# Patient Record
Sex: MALE | Race: WHITE | NOT HISPANIC OR LATINO | Employment: FULL TIME | URBAN - METROPOLITAN AREA
[De-identification: names, ages, dates, MRNs, and addresses within clinical notes are randomized per-mention and may not be internally consistent; named-entity substitution may affect disease eponyms.]

---

## 2024-06-13 ENCOUNTER — APPOINTMENT (OUTPATIENT)
Dept: RADIOLOGY | Facility: IMAGING CENTER | Age: 24
End: 2024-06-13
Attending: PHYSICIAN ASSISTANT
Payer: COMMERCIAL

## 2024-06-13 ENCOUNTER — OCCUPATIONAL MEDICINE (OUTPATIENT)
Dept: URGENT CARE | Facility: CLINIC | Age: 24
End: 2024-06-13
Payer: COMMERCIAL

## 2024-06-13 VITALS
RESPIRATION RATE: 18 BRPM | HEIGHT: 71 IN | DIASTOLIC BLOOD PRESSURE: 86 MMHG | HEART RATE: 114 BPM | WEIGHT: 221 LBS | OXYGEN SATURATION: 96 % | SYSTOLIC BLOOD PRESSURE: 122 MMHG | TEMPERATURE: 99.2 F | BODY MASS INDEX: 30.94 KG/M2

## 2024-06-13 DIAGNOSIS — M54.2 NECK PAIN: ICD-10-CM

## 2024-06-13 DIAGNOSIS — S16.1XXA STRAIN OF NECK MUSCLE, INITIAL ENCOUNTER: ICD-10-CM

## 2024-06-13 DIAGNOSIS — Z02.6 ENCOUNTER RELATED TO WORKER'S COMPENSATION CLAIM: ICD-10-CM

## 2024-06-13 PROCEDURE — 3074F SYST BP LT 130 MM HG: CPT | Performed by: PHYSICIAN ASSISTANT

## 2024-06-13 PROCEDURE — 72040 X-RAY EXAM NECK SPINE 2-3 VW: CPT | Mod: TC | Performed by: RADIOLOGY

## 2024-06-13 PROCEDURE — 3079F DIAST BP 80-89 MM HG: CPT | Performed by: PHYSICIAN ASSISTANT

## 2024-06-13 PROCEDURE — 99203 OFFICE O/P NEW LOW 30 MIN: CPT | Mod: 29 | Performed by: PHYSICIAN ASSISTANT

## 2024-06-13 RX ORDER — CYCLOBENZAPRINE HCL 10 MG
5-10 TABLET ORAL 3 TIMES DAILY PRN
Qty: 30 TABLET | Refills: 0 | Status: SHIPPED | OUTPATIENT
Start: 2024-06-13

## 2024-06-13 RX ORDER — KETOROLAC TROMETHAMINE 30 MG/ML
15 INJECTION, SOLUTION INTRAMUSCULAR; INTRAVENOUS ONCE
Status: COMPLETED | OUTPATIENT
Start: 2024-06-13 | End: 2024-06-13

## 2024-06-13 RX ADMIN — KETOROLAC TROMETHAMINE 15 MG: 30 INJECTION, SOLUTION INTRAMUSCULAR; INTRAVENOUS at 14:13

## 2024-06-13 NOTE — PROGRESS NOTES
"Subjective:   Gino Callahan is a 24 y.o. male who presents for Work-Related Injury (W/C new back injury 6/12/2024)     DOI 6/12/2024  ELTON: This is a pleasant 24-year-old male who works as a , had a large branch [place] fall from approximately 25 feet and struck him on the back of the neck yesterday.  He did not lose consciousness.  Since then he is experienced pain to the neck and upper trapezium.  He denies upper extremity numbness tingling or weakness.  He has no other forms of employment.    Medications:  This patient does not have an active medication from one of the medication groupers.    Allergies:             Patient has no known allergies.    Surgical History:       No past surgical history on file.    Past Social Hx:  Gino Callahan       Past Family Hx:   Gino Callahan family history is not on file.       Problem list, medications, and allergies reviewed by myself today in Epic.     Objective:     /86   Pulse (!) 114   Temp 37.3 °C (99.2 °F) (Temporal)   Resp 18   Ht 1.803 m (5' 11\")   Wt 100 kg (221 lb)   SpO2 96%   BMI 30.82 kg/m²     Physical Exam  Vitals and nursing note reviewed.   Constitutional:       General: He is not in acute distress.     Appearance: Normal appearance. He is not ill-appearing.   HENT:      Head: Normocephalic.   Eyes:      Extraocular Movements: Extraocular movements intact.      Pupils: Pupils are equal, round, and reactive to light.   Neck:        Comments: Tenderness to palpation of the bilateral cervical paraspinous musculature.  Mild tenderness across the cervical thoracic junction in the midline.  Motor 5/5 bilateral upper extremities.  Sensation intact.  DTRs 1+.  Cervical spine range of motion limited by 20% and with plantar fashion  Cardiovascular:      Rate and Rhythm: Normal rate.   Pulmonary:      Effort: Pulmonary effort is normal.   Skin:     General: Skin is warm.      Findings: No rash.   Neurological:      Mental Status: He is alert and " oriented to person, place, and time.   Psychiatric:         Thought Content: Thought content normal.         Judgment: Judgment normal.         RADIOLOGY RESULTS   DX-CERVICAL SPINE-2 OR 3 VIEWS    Result Date: 6/13/2024 6/13/2024 1:33 PM HISTORY/REASON FOR EXAM:  Pain Following Trauma. Struck by tree limb from above yesterday. TECHNIQUE/EXAM DESCRIPTION AND NUMBER OF VIEWS: Cervical spine series, 4 views. COMPARISON:  None. FINDINGS: The cervical vertebral bodies are normal in appearance and alignment is normal. The intervertebral disc spaces are well preserved. No soft tissue abnormality is identified.     Normal cervical spine.         *X-rays were reviewed and interpreted independently by me. I agree with the radiologist's findings     Assessment/Plan:     Diagnosis and Associated Orders:     1. Encounter related to worker's compensation claim  - DX-CERVICAL SPINE-2 OR 3 VIEWS; Future  - ketorolac (Toradol) injection 15 mg  - cyclobenzaprine (FLEXERIL) 10 mg Tab; Take 0.5-1 Tablets by mouth 3 times a day as needed for Muscle Spasms.  Dispense: 30 Tablet; Refill: 0    2. Neck pain  - DX-CERVICAL SPINE-2 OR 3 VIEWS; Future  - ketorolac (Toradol) injection 15 mg  - cyclobenzaprine (FLEXERIL) 10 mg Tab; Take 0.5-1 Tablets by mouth 3 times a day as needed for Muscle Spasms.  Dispense: 30 Tablet; Refill: 0    3. Strain of neck muscle, initial encounter  - ketorolac (Toradol) injection 15 mg  - cyclobenzaprine (FLEXERIL) 10 mg Tab; Take 0.5-1 Tablets by mouth 3 times a day as needed for Muscle Spasms.  Dispense: 30 Tablet; Refill: 0        Comments/MDM:  No x-ray evidence of radiographic abnormality  15 mg IM Toradol administered in clinic.  Flexeril as needed spasm, caution sedation.  Do not work while using  Alternate ice and heat  D39 and C4 forms filled out by hand  Work restrictions, no lifting greater than 20 pounds, no pushing or pulling  32 minutes was allotted and spent for patient care and coordination of  care (not reported separately) including preparing for the visit, obtaining/reviewing history from/with patient, filling out C4 and D39 forms, performing an exam/evaluation, ordering Rx, developing a plan of care, counseling/educating the patient, developing the discharge summary for release to [facility], and documentation. This template was updated to summarize care specific to this encounter.    I personally reviewed prior external notes and test results pertinent to today's visit. Supportive care, natural history, differential diagnoses, and indications for immediate follow-up discussed. Return to clinic or go to ED if symptoms worsen or persist.  Red flag symptoms discussed.  Patient/Parent/Guardian voices understanding. Follow-up with your primary care provider in 3-5 days.  All side effects of medication discussed including allergic response, GI upset, tendon injury, rash, sedation etc    Please note that this dictation was created using voice recognition software. I have made a reasonable attempt to correct obvious errors, but I expect that there are errors of grammar and possibly content that I did not discover before finalizing the note.    This note was electronically signed by Luz Menjivar PA-C

## 2024-06-18 ENCOUNTER — OCCUPATIONAL MEDICINE (OUTPATIENT)
Dept: URGENT CARE | Facility: CLINIC | Age: 24
End: 2024-06-18
Payer: COMMERCIAL

## 2024-06-18 VITALS
HEART RATE: 87 BPM | DIASTOLIC BLOOD PRESSURE: 78 MMHG | RESPIRATION RATE: 16 BRPM | SYSTOLIC BLOOD PRESSURE: 122 MMHG | HEIGHT: 71 IN | OXYGEN SATURATION: 94 % | BODY MASS INDEX: 30.94 KG/M2 | TEMPERATURE: 97.8 F | WEIGHT: 221 LBS

## 2024-06-18 DIAGNOSIS — S16.1XXD STRAIN OF NECK MUSCLE, SUBSEQUENT ENCOUNTER: ICD-10-CM

## 2024-06-18 DIAGNOSIS — Z02.6 ENCOUNTER RELATED TO WORKER'S COMPENSATION CLAIM: ICD-10-CM

## 2024-06-18 PROCEDURE — 3078F DIAST BP <80 MM HG: CPT | Performed by: NURSE PRACTITIONER

## 2024-06-18 PROCEDURE — 99214 OFFICE O/P EST MOD 30 MIN: CPT | Performed by: NURSE PRACTITIONER

## 2024-06-18 PROCEDURE — 3074F SYST BP LT 130 MM HG: CPT | Performed by: NURSE PRACTITIONER

## 2024-06-18 ASSESSMENT — ENCOUNTER SYMPTOMS
NAUSEA: 0
TINGLING: 0
FOCAL WEAKNESS: 0
DIZZINESS: 0
SENSORY CHANGE: 0
NECK PAIN: 1
CHILLS: 0
BACK PAIN: 0
FEVER: 0

## 2024-06-18 NOTE — PROGRESS NOTES
"Barbara Callahan is a 24 y.o. male who presents with Follow-Up            HPI DOI : 06/12/24 ( second visit)   Still having some neck stiffness Pain 1-2/10. Has full mobility of his neck.  Not really sleeping well due to neck discomfort.   Taking ibuprofen . Did not start flexeril as it was not available at that pharmacy when he went to pick it up and he has not gone back to get it.   He is currently working with work restrictions.   No other aggravating or alleviating factors.  Denies any other concerns at this time.       Review of Systems   Constitutional:  Negative for chills and fever.   Gastrointestinal:  Negative for nausea.   Musculoskeletal:  Positive for neck pain. Negative for back pain.   Neurological:  Negative for dizziness, tingling, sensory change and focal weakness.          Reviewed past medical, surgical and family history. Reviewed prescription and OTC medications with patient in electronic health record today  Allergies: Patient has no known allergies.          Objective     /78   Pulse 87   Temp 36.6 °C (97.8 °F) (Temporal)   Resp 16   Ht 1.803 m (5' 11\")   Wt 100 kg (221 lb)   SpO2 94%   BMI 30.82 kg/m²      Physical Exam  Vitals reviewed.   Constitutional:       Appearance: Normal appearance. He is normal weight.   Neck:      Trachea: Trachea and phonation normal.   Cardiovascular:      Rate and Rhythm: Normal rate.      Pulses: Normal pulses.   Pulmonary:      Effort: Pulmonary effort is normal.   Musculoskeletal:      Cervical back: Full passive range of motion without pain, normal range of motion and neck supple. No edema, erythema, signs of trauma, rigidity or crepitus. Muscular tenderness present. No pain with movement or spinous process tenderness. Normal range of motion.   Skin:     General: Skin is warm.      Capillary Refill: Capillary refill takes less than 2 seconds.   Neurological:      Mental Status: He is alert.                             Assessment & " Plan        1. Strain of neck muscle, subsequent encounter        2. Encounter related to worker's compensation claim            See [place] D39 - handwritten  Released to full duty work.  Trial of 1 week.  Ice or heat packs as needed pain, stiffness  Over-the-counter topical analgesics as needed pain or stiffness  Over-the-counter analgesics as needed pain or stiffness.  Dosing per .  Gentle range of motion exercises  Good body mechanics and ergonomics       I have spent 31 minutes on the care of Gino Callahan.  This includes preparing for the urgent care visit. This time includes review of previous visits/ documents, obtaining HPI, examination and evaluation of patient, ordering and interpretation of labs, imaging, tests, medical management, counseling, education and documentation.